# Patient Record
Sex: MALE | Race: WHITE
[De-identification: names, ages, dates, MRNs, and addresses within clinical notes are randomized per-mention and may not be internally consistent; named-entity substitution may affect disease eponyms.]

---

## 2018-11-19 ENCOUNTER — HOSPITAL ENCOUNTER (OUTPATIENT)
Dept: HOSPITAL 74 - FASU | Age: 38
Discharge: HOME | End: 2018-11-19
Attending: PLASTIC SURGERY
Payer: OTHER GOVERNMENT

## 2018-11-19 VITALS — SYSTOLIC BLOOD PRESSURE: 120 MMHG | TEMPERATURE: 98 F | DIASTOLIC BLOOD PRESSURE: 73 MMHG | HEART RATE: 67 BPM

## 2018-11-19 VITALS — BODY MASS INDEX: 25.9 KG/M2
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
## 2018-11-19 DIAGNOSIS — S66.021A: Primary | ICD-10-CM

## 2018-11-19 PROCEDURE — 0LQ70ZZ REPAIR RIGHT HAND TENDON, OPEN APPROACH: ICD-10-PCS | Performed by: PLASTIC SURGERY

## 2018-11-19 NOTE — OP
Operative Note





- Note:


Operative Date: 11/19/18


Pre-Operative Diagnosis: right thumb FPL laceration


Operation: right thumb tendon laceration repair


Findings: 





laceration right thumb tendon


Post-Operative Diagnosis: Same as Pre-op


Surgeon: Goldberg,Neal D


Assistant: Aaron Pritchard


Anesthesia: General


Operative Report Dictated: Yes

## 2018-11-20 NOTE — OP
DATE OF OPERATION:  11/19/2018

 

TITLE OF PROCEDURE:   Right thumb flexor pollicis longus tendon repair.

 

ATTENDING SURGEON:  Neal Goldberg, MD

 

FIRST ASSISTANT:  Aaron Pritchard MD

 

PREOPERATIVE DIAGNOSIS:  Tendon laceration, flexor pollicis longus tendon to the

right thumb.

 

POSTOPERATIVE DIAGNOSIS:  Tendon laceration, flexor pollicis longus tendon to the

right thumb.

 

The patient is marked in the holding area.  It was discussed with the patient all

risks, benefits, and alternatives to the procedure as well as possible necessary

additional procedures depending on the findings.  He understands and agrees to

proceed.

 

Patient brought to the operating room, placed in supine position.  After induction of

general anesthesia, hand was draped and prepped in standard surgical fashion.  A

tourniquet had been applied on the arm.  The patient's thumb is marked.  A timeout is

called.  Patient, procedure, site, and side are verified.  At this point, the hand is

elevated, Esmarch exsanguinated.  Tourniquet pressure is 225 mmHg.  Total tourniquet

time for this case is 16 minutes.  Under direct vision and loupe magnification, a

Brunner-type incision is made incorporating the existing laceration scar on the

patient's thumb.  The radial and ulnar neurovascular bundles are visualized and

protected.  Dissection is carried down to the level of the flexor tendon sheath where

both distal fragment of the tendon is identified with adequate length for suture

purchase, and the proximal extent of the tendon is able to be delivered from the A2

pulley which is able to be preserved without dividing.  There is a fibrous connection

between the 2 tendons.  This is debrided.  A primary repair is able to be performed

using a 4-0 Prolene, 4-stranded, cruciate core suture, followed by a 6-0 nylon

epitendinous suture.  Neurovascular bundles are intact.  The wound was copiously

irrigated with normal saline.  The skin flap is repaired with a series of interrupted

5-0 nylon sutures.  The tourniquet is released.  The fingertip is pink and viable. 

Dressings were applied with bacitracin and Xeroform, 2-inch Danisha, and a flexion

3-inch Ortho-Glass splint and an Ace wrap.  Patient awoken from anesthesia without

complication.  It should be known that this is a repair distal to the A2 pulley. 

Please note at the end of the procedure, a total of 4 mL of 0.5% Marcaine plain is

injected as a digital block.

 

 

NEAL GOLDBERG, M.D. [id number]

DD: 11/19/2018 18:42

DT: 11/20/2018 08:13

Job #:  [id number]